# Patient Record
Sex: FEMALE | Race: WHITE | NOT HISPANIC OR LATINO | URBAN - METROPOLITAN AREA
[De-identification: names, ages, dates, MRNs, and addresses within clinical notes are randomized per-mention and may not be internally consistent; named-entity substitution may affect disease eponyms.]

---

## 2017-03-28 ENCOUNTER — TRANSFERRED RECORDS (OUTPATIENT)
Dept: HEALTH INFORMATION MANAGEMENT | Facility: CLINIC | Age: 67
End: 2017-03-28

## 2017-04-19 ENCOUNTER — TRANSFERRED RECORDS (OUTPATIENT)
Dept: HEALTH INFORMATION MANAGEMENT | Facility: CLINIC | Age: 67
End: 2017-04-19

## 2017-07-25 ENCOUNTER — TRANSFERRED RECORDS (OUTPATIENT)
Dept: HEALTH INFORMATION MANAGEMENT | Facility: CLINIC | Age: 67
End: 2017-07-25

## 2017-07-28 ENCOUNTER — TRANSFERRED RECORDS (OUTPATIENT)
Dept: HEALTH INFORMATION MANAGEMENT | Facility: CLINIC | Age: 67
End: 2017-07-28

## 2017-07-31 ENCOUNTER — TRANSFERRED RECORDS (OUTPATIENT)
Dept: HEALTH INFORMATION MANAGEMENT | Facility: CLINIC | Age: 67
End: 2017-07-31

## 2017-08-09 ENCOUNTER — MEDICAL CORRESPONDENCE (OUTPATIENT)
Dept: HEALTH INFORMATION MANAGEMENT | Facility: CLINIC | Age: 67
End: 2017-08-09

## 2017-10-30 ENCOUNTER — TRANSFERRED RECORDS (OUTPATIENT)
Dept: HEALTH INFORMATION MANAGEMENT | Facility: CLINIC | Age: 67
End: 2017-10-30

## 2017-11-09 ENCOUNTER — TRANSFERRED RECORDS (OUTPATIENT)
Dept: HEALTH INFORMATION MANAGEMENT | Facility: CLINIC | Age: 67
End: 2017-11-09

## 2017-12-12 ENCOUNTER — OFFICE VISIT (OUTPATIENT)
Dept: PHYSICAL MEDICINE AND REHAB | Facility: CLINIC | Age: 67
End: 2017-12-12

## 2017-12-12 VITALS — HEIGHT: 62 IN | DIASTOLIC BLOOD PRESSURE: 92 MMHG | SYSTOLIC BLOOD PRESSURE: 158 MMHG | HEART RATE: 75 BPM

## 2017-12-12 DIAGNOSIS — M67.969 LOWER EXTREMITY TENDINOPATHY: Primary | ICD-10-CM

## 2017-12-12 DIAGNOSIS — G89.4 CHRONIC PAIN SYNDROME: ICD-10-CM

## 2017-12-12 RX ORDER — NORTRIPTYLINE HCL 10 MG
10 CAPSULE ORAL AT BEDTIME
COMMUNITY
Start: 2017-11-16 | End: 2018-04-17

## 2017-12-12 RX ORDER — ALENDRONATE SODIUM 70 MG/1
70 TABLET ORAL
COMMUNITY
Start: 2013-08-01

## 2017-12-12 RX ORDER — GABAPENTIN 300 MG/1
300 CAPSULE ORAL 3 TIMES DAILY
COMMUNITY
Start: 2017-07-24

## 2017-12-12 RX ORDER — EPINEPHRINE 0.3 MG/.3ML
0.3 INJECTION SUBCUTANEOUS
COMMUNITY
Start: 2017-03-30

## 2017-12-12 RX ORDER — NAPROXEN 500 MG/1
1 TABLET ORAL 2 TIMES DAILY
COMMUNITY
Start: 2016-07-01

## 2017-12-12 ASSESSMENT — PAIN SCALES - GENERAL: PAINLEVEL: SEVERE PAIN (7)
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
## 2017-12-12 NOTE — NURSING NOTE
Chief Complaint   Patient presents with     Consult     Nerve issues    Aimee Fernandes, TIFFANY
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
## 2017-12-12 NOTE — MR AVS SNAPSHOT
"              After Visit Summary   12/12/2017    Shabana Ware    MRN: [id number]           Patient Information     Date Of Birth          1950        Visit Information        Provider Department      12/12/2017 1:00 PM More Samson MD [facility] Physical Medicine and Rehabilitation        Today's Diagnoses     Lower extremity tendinopathy    -  1    Chronic pain syndrome           Follow-ups after your visit        Additional Services     PHYSICAL THERAPY REFERRAL       Right hip adductor tendinopathy     Treatment: Evaluation & Treatment  Special Instructions/Modalities: as needed for pain management      Please be aware that coverage of these services is subject to the terms and limitations of your health insurance plan.  Call member services at your health plan with any benefit or coverage questions.      **Note to Provider:  If you are referring outside of [facility] for the therapy appointment, please list the name of the location in the \"special instructions\" above, print the referral and give to the patient to schedule the appointment.                  Follow-up notes from your care team     Return in about 3 months (around 3/12/2018).      Your next 10 appointments already scheduled     Mar 16, 2018 11:20 AM CDT   (Arrive by 11:05 AM)   Return Visit with More Samson MD   [facility] Physical Medicine and Rehabilitation ([facility] [place])    [address] [place] [id number] [phone]              Who to contact     Please call your clinic at [phone] to:    Ask questions about your health    Make or cancel appointments    Discuss your medicines    Learn about your test results    Speak to your doctor   If you have compliments or concerns about an experience at your clinic, or if you wish to file a complaint, please contact [facility] Phys[facility] Patient Relations at [phone] or email us at [email or web address]         " "Additional Information About Your Visit        [facility] Information     [facility] gives you secure access to your electronic health record. If you see a primary care provider, you can also send messages to your care team and make appointments. If you have questions, please call your primary care clinic.  If you do not have a primary care provider, please call [phone] and they will assist you.      [facility] is an electronic gateway that provides easy, online access to your medical records. With [facility], you can request a clinic appointment, read your test results, renew a prescription or communicate with your care team.     To access your existing account, please contact your [facility] or call [phone] for assistance.        Care EveryWhere ID     This is your Care EveryWhere ID. This could be used by other organizations to access your [facility] medical records  [id number]        Your Vitals Were     Pulse Height                75 1.575 m (5' 2\")           Blood Pressure from Last 3 Encounters:   12/12/17 (!) 158/92    Weight from Last 3 Encounters:   No data found for Wt              We Performed the Following     PHYSICAL THERAPY REFERRAL        Primary Care Provider Office Phone # Fax #    Chelsey I Ernesto [phone]       [facility] [address] [place] [address]        Equal Access to Services     TERESA MCGRAW : Hadii aad ku hadasho Soomaali, waaxda luqadaha, qaybta kaalmada adeegyada, waxay idiin hayspensern gurdeep kumar lamony anand. So [facility] [phone].    ATENCIÓN: Si habla español, tiene a byrnes disposición servicios gratuitos de asistencia lingüística. Llame al [phone].    We comply with applicable federal civil rights laws and [place] laws. We do not discriminate on the basis of race, color, national origin, age, disability, sex, sexual orientation, or gender identity.            Thank you!     Thank you for choosing  [facility]  for your care. Our goal is always to provide you with excellent care. Hearing back from our patients is one way we can continue to improve our services. Please take a few minutes to complete the written survey that you may receive in the mail after your visit with us. Thank you!             Your Updated Medication List - Protect others around you: Learn how to safely use, store and throw away your medicines at www.disposemymeds.org.          This list is accurate as of: 12/12/17 11:59 PM.  Always use your most recent med list.                   Brand Name Dispense Instructions for use Diagnosis    alendronate 70 MG tablet    FOSAMAX          EPINEPHrine 0.3 MG/0.3ML injection 2-pack    EPIPEN/ADRENACLICK/or ANY BX GENERIC EQUIV     Inject 0.3 mg into the muscle        gabapentin 300 MG capsule    NEURONTIN          naproxen 500 MG Tbec   Generic drug:  naproxen           nortriptyline 10 MG capsule    PAMELOR

## 2018-02-21 ENCOUNTER — OFFICE VISIT (OUTPATIENT)
Dept: PHYSICAL MEDICINE AND REHAB | Facility: CLINIC | Age: 68
End: 2018-02-21
Payer: MEDICARE

## 2018-02-21 VITALS
DIASTOLIC BLOOD PRESSURE: 87 MMHG | SYSTOLIC BLOOD PRESSURE: 180 MMHG | HEART RATE: 62 BPM | HEIGHT: 62 IN | WEIGHT: 134.1 LBS | BODY MASS INDEX: 24.68 KG/M2

## 2018-02-21 DIAGNOSIS — Z96.641 HISTORY OF TOTAL RIGHT HIP REPLACEMENT: ICD-10-CM

## 2018-02-21 DIAGNOSIS — G89.29 CHRONIC RIGHT HIP PAIN: Primary | ICD-10-CM

## 2018-02-21 DIAGNOSIS — M25.551 CHRONIC RIGHT HIP PAIN: Primary | ICD-10-CM

## 2018-02-21 NOTE — PATIENT INSTRUCTIONS
We addressed the following today:    1. Right hip pain  2. History of right total hip replacement    Activity modification as discussed  Home exercise program as instructed  Topical Treatments: Ice or heat  Over the counter medication: Acetaminophen (Tylenol) 1000 mg every 6 hours with food (Maximum of 3000 mg/day)  Ibuprofen (Advil) maximum of 800 mg four times a day with food  Call after obtaining outside medical records/imaging and reviewing to discuss further medical care (sooner if needed; call direct clinic number [[phone]] at any time with questions/concerns)

## 2018-02-21 NOTE — PROGRESS NOTES
"[facility] Physical Medicine & Rehabilitation Clinic Note  Clinic Visit s Feb 21, 2018    Subjective:  Shabana Ware is a 67 year old female who is seen as self referral for evaluation of chronic right hip pain.    Symptoms began 2.5 years ago.  Reports insidious onset without acute precipitating event.  Reports right hip pain that is located anterior with radiation present (right medial thigh pain to the right knee).  Pain is 8/10 in maximal severity and 7/10 currently.  Symptoms are generally worse with walking activities, physical activities (including swimming and ellipitical), and with sitting activities and better with ice.  Treatment has consisted of Gabapentin, Lyrica, a right hip adductor corticosteroid injection, a right hip flexor corticosteroid injection, acupuncture, myofascial release, physical therapy, a right total hip arthroplasty, L2-3 and L3-4 epidural corticosteroid injection, a right obturator/femoral nerve block/radiofrequency ablation, Ketamine gel, Graston therapy, Voltaren gel, and Naproxen without long-term relief.  Denies any weakness of the right lower extremity.  Notes numbness/tingling of the right medial thigh.  Denies any change in symptoms with coughing/sneezing.    Patient's past medical, surgical, social, and family histories were reviewed today.  Significant medical history as noted above.  History reviewed. No pertinent past medical history.    Review of Systems:  Constitutional: NEGATIVE for fever, chills, or change in weight  Skin: NEGATIVE for worrisome rashes, moles, or lesions  Neuro: NEGATIVE for weakness of the right lower extremity  MSK: see HPI    Objective:  /87  Pulse 62  Ht 1.575 m (5' 2\")  Wt 60.8 kg (134 lb 1.6 oz)  BMI 24.53 kg/m2  General: healthy, alert, and in mild distress  Skin: no suspicious lesions or rashes  Psych: mentation appears normal and affect normal/bright  HEENT: no scleral icterus  CV: no pedal edema  Resp: normal respiratory " effort without conversational dyspnea   Neuro: sensory exam is within normal limits.  Motor strength as noted below  Lymph: no palpable lymphadenopathy    MSK:    RIGHT HIP  Inspection:    Anterolateral surgical scar noted of the hip region  Palpation:    Tender about the anterior groin/joint line and adductor musculature    No tenderness over the greater trochanteric region, gluteal muscles, or ASIS  Passive Range of Motion:    IR within normal limits / ER within normal limits with pain  Strength:    Flexion 5-/5 / adduction 5-/5 with pain / abduction 5/5  Special Tests:    Positive: Logroll    Negative: resisted gluteus medius provocation, anterior impingement (FADIR), Leeann's, Shorty, and Symone's    Imaging:  Outside reports from [facility] were reviewed today and results were discussed with the patient  EMG/NCV of the Right Lower Extremity -10/30/2017   Clinical Interpretation:  1. This is an abnormal study.  2. There is electrodiagnostic evidence of a chronic inactive right L4 radiculopathy.  3. There is no evidence of an obturator mononeuropathy    CT of the Pelvis without Contrast   Impression:  1.  Right total hip arthroplasty.  There is some heterotopic mineralization associated with the lesser trochanter extending anterior to the hip.  There is a fluid collection deep to the rectus femoris and tensor fascia elaine anterior to the right hip joint measuring approximately 22 x 15 x 23 mm.    2.  No evidence for fracture.  3.  Advanced osteoarthritis of the left hip with postoperative changes of the lumbar spine.     ASSESSMENT:  1.  Chronic right hip pain - differential diagnoses includes adductor tendinopathy, peripheral nerve entrapment, failed total hip arthroplasty, etc.  2.  History of a right total hip arthroplasty    PLAN:  1.  Medical records release form signed to obtain outside medical records from [facility] and outside medical records/imaging from [facility].  2.  Activity modification  as discussed, including limitation of activities that cause pain/discomfort.  3.  Acetaminophen/Ibuprofen/ice/heat as needed for improved pain control.  4.  Call/MyChart 1-2 business days after obtaining all outside medical records after review for discussion of results/further medical care.  Consider an MRI of the right hip without contrast, a repeat EMG/NCV of the right lower extremity, bone scan, etc. as deemed appropriate moving forward.  If symptoms resolve completely, can follow-up as needed.      I spent a total of 35 minutes face-to-face with the patient during today's office visit.  Over 50% of the time was spent counseling the patient and/or coordinating care regarding previous diagnostic testing, previous treatment, further treatment, etc.  See office note for details.    Angel Blood DO, ELVIEM    Department of Rehabilitation Medicine

## 2018-02-21 NOTE — MR AVS SNAPSHOT
After Visit Summary   2/21/2018    Shabana Ware    MRN: [id number]           Patient Information     Date Of Birth          1950        Visit Information        Provider Department      2/21/2018 10:00 AM Angel Blood DO M [facility] Physical Medicine and Rehabilitation        Today's Diagnoses     Chronic right hip pain    -  1    History of total right hip replacement          Care Instructions    We addressed the following today:    1. Right hip pain  2. History of right total hip replacement    Activity modification as discussed  Home exercise program as instructed  Topical Treatments: Ice or heat  Over the counter medication: Acetaminophen (Tylenol) 1000 mg every 6 hours with food (Maximum of 3000 mg/day)  Ibuprofen (Advil) maximum of 800 mg four times a day with food  Call after obtaining outside medical records/imaging and reviewing to discuss further medical care (sooner if needed; call direct clinic number [[phone]] at any time with questions/concerns)            Follow-ups after your visit        Follow-up notes from your care team     Call patient with results      Your next 10 appointments already scheduled     Mar 21, 2018 12:30 PM CDT   (Arrive by 12:15 PM)   Return Visit with DO SHANTEL Vizcarra [facility] Physical Medicine and Rehabilitation ([facility])    [address] [place] [id number] [phone]              Who to contact     Please call your clinic at [phone] to:    Ask questions about your health    Make or cancel appointments    Discuss your medicines    Learn about your test results    Speak to your doctor            Additional Information About Your Visit        MyChart Information     [employer] gives you secure access to your electronic health record. If you see a primary care provider, you can also send messages to your care team and make appointments. If you have questions, please call your primary care  "clinic.  If you do not have a primary care provider, please call [phone] and they will assist you.      [employer] is an electronic gateway that provides easy, online access to your medical records. With [employer], you can request a clinic appointment, read your test results, renew a prescription or communicate with your care team.     To access your existing account, please contact your [facility] or call [phone] for assistance.        Care EveryWhere ID     This is your Care EveryWhere ID. This could be used by other organizations to access your [facility] medical records  [id number]        Your Vitals Were     Pulse Height BMI (Body Mass Index)             62 1.575 m (5' 2\") 24.53 kg/m2          Blood Pressure from Last 3 Encounters:   02/21/18 180/87   12/12/17 (!) 158/92    Weight from Last 3 Encounters:   02/21/18 60.8 kg (134 lb 1.6 oz)              Today, you had the following     No orders found for display       Primary Care Provider Office Phone # Fax #    Chelsey I [place] [phone]       [facility] [address] [place] [address]        Equal Access to Services     TERESA MCGRAW : Hadii aad ku hadasho Soomaali, waaxda luqadaha, qaybta kaalmada adeegyada, bebe anand. So [facility] [phone].    ATENCIÓN: Si habla español, tiene a byrnes disposición servicios gratuitos de asistencia lingüística. Llame al [phone].    We comply with applicable federal civil rights laws and [place] laws. We do not discriminate on the basis of race, color, national origin, age, disability, sex, sexual orientation, or gender identity.            Thank you!     Thank you for choosing [facility] PHYSICAL MEDICINE AND REHABILITATION  for your care. Our goal is always to provide you with excellent care. Hearing back from our patients is one way we can continue to improve our services. Please take a few minutes to complete the written " survey that you may receive in the mail after your visit with us. Thank you!             Your Updated Medication List - Protect others around you: Learn how to safely use, store and throw away your medicines at www.disposemymeds.org.          This list is accurate as of 2/21/18 11:19 AM.  Always use your most recent med list.                   Brand Name Dispense Instructions for use Diagnosis    alendronate 70 MG tablet    FOSAMAX          EPINEPHrine 0.3 MG/0.3ML injection 2-pack    EPIPEN/ADRENACLICK/or ANY BX GENERIC EQUIV     Inject 0.3 mg into the muscle        gabapentin 300 MG capsule    NEURONTIN          naproxen 500 MG Tbec   Generic drug:  naproxen           nortriptyline 10 MG capsule    PAMELOR

## 2018-02-21 NOTE — NURSING NOTE
Chief Complaint   Patient presents with     Consult     referral from Dr. Samson for right hip pain, s/p right obturator/hip RFA     Gemma jonas

## 2018-02-21 NOTE — LETTER
2/21/2018       RE: Shabana Ware  [address]  [place] [address]     Dear Colleague,    Thank you for referring your patient, Shabana Ware, to the [facility] PHYSICAL MEDICINE AND REHABILITATION at [facility]. Please see a copy of my visit note below.    [facility] Physical Medicine & Rehabilitation Clinic Note  Clinic Visit s Feb 21, 2018    Subjective:  Shabana Ware is a 67 year old female who is seen as self referral for evaluation of chronic right hip pain.    Symptoms began 2.5 years ago.  Reports insidious onset without acute precipitating event.  Reports right hip pain that is located anterior with radiation present (right medial thigh pain to the right knee).  Pain is 8/10 in maximal severity and 7/10 currently.  Symptoms are generally worse with walking activities, physical activities (including swimming and ellipitical), and with sitting activities and better with ice.  Treatment has consisted of Gabapentin, Lyrica, a right hip adductor corticosteroid injection, a right hip flexor corticosteroid injection, acupuncture, myofascial release, physical therapy, a right total hip arthroplasty, L2-3 and L3-4 epidural corticosteroid injection, a right obturator/femoral nerve block/radiofrequency ablation, Ketamine gel, Graston therapy, Voltaren gel, and Naproxen without long-term relief.  Denies any weakness of the right lower extremity.  Notes numbness/tingling of the right medial thigh.  Denies any change in symptoms with coughing/sneezing.    Patient's past medical, surgical, social, and family histories were reviewed today.  Significant medical history as noted above.  History reviewed. No pertinent past medical history.    Review of Systems:  Constitutional: NEGATIVE for fever, chills, or change in weight  Skin: NEGATIVE for worrisome rashes, moles, or lesions  Neuro: NEGATIVE for weakness of the right lower extremity  MSK: see  "HPI    Objective:  /87  Pulse 62  Ht 1.575 m (5' 2\")  Wt 60.8 kg (134 lb 1.6 oz)  BMI 24.53 kg/m2  General: healthy, alert, and in mild distress  Skin: no suspicious lesions or rashes  Psych: mentation appears normal and affect normal/bright  HEENT: no scleral icterus  CV: no pedal edema  Resp: normal respiratory effort without conversational dyspnea   Neuro: sensory exam is within normal limits.  Motor strength as noted below  Lymph: no palpable lymphadenopathy    MSK:    RIGHT HIP  Inspection:    Anterolateral surgical scar noted of the hip region  Palpation:    Tender about the anterior groin/joint line and adductor musculature    No tenderness over the greater trochanteric region, gluteal muscles, or ASIS  Passive Range of Motion:    IR within normal limits / ER within normal limits with pain  Strength:    Flexion 5-/5 / adduction 5-/5 with pain / abduction 5/5  Special Tests:    Positive: Logroll    Negative: resisted gluteus medius provocation, anterior impingement (FADIR), Leeann's, Shorty, and Symone's    Imaging:  Outside reports from [facility] were reviewed today and results were discussed with the patient  EMG/NCV of the Right Lower Extremity -10/30/2017   Clinical Interpretation:  1. This is an abnormal study.  2. There is electrodiagnostic evidence of a chronic inactive right L4 radiculopathy.  3. There is no evidence of an obturator mononeuropathy    CT of the Pelvis without Contrast   Impression:  1.  Right total hip arthroplasty.  There is some heterotopic mineralization associated with the lesser trochanter extending anterior to the hip.  There is a fluid collection deep to the rectus femoris and tensor fascia elaine anterior to the right hip joint measuring approximately 22 x 15 x 23 mm.    2.  No evidence for fracture.  3.  Advanced osteoarthritis of the left hip with postoperative changes of the lumbar spine.     ASSESSMENT:  1.  Chronic right hip pain - differential diagnoses " includes adductor tendinopathy, peripheral nerve entrapment, failed total hip arthroplasty, etc.  2.  History of a right total hip arthroplasty    PLAN:  1.  Medical records release form signed to obtain outside medical records from [facility] and outside medical records/imaging from [facility].  2.  Activity modification as discussed, including limitation of activities that cause pain/discomfort.  3.  Acetaminophen/Ibuprofen/ice/heat as needed for improved pain control.  4.  Call/MyChart 1-2 business days after obtaining all outside medical records after review for discussion of results/further medical care.  Consider an MRI of the right hip without contrast, a repeat EMG/NCV of the right lower extremity, bone scan, etc. as deemed appropriate moving forward.  If symptoms resolve completely, can follow-up as needed.      I spent a total of 35 minutes face-to-face with the patient during today's office visit.  Over 50% of the time was spent counseling the patient and/or coordinating care regarding previous diagnostic testing, previous treatment, further treatment, etc.  See office note for details.      Again, thank you for allowing me to participate in the care of your patient.      Sincerely,    Angel Blood, DO

## 2018-03-01 ENCOUNTER — TELEPHONE (OUTPATIENT)
Dept: PHYSICAL MEDICINE AND REHAB | Facility: CLINIC | Age: 68
End: 2018-03-01

## 2018-03-01 DIAGNOSIS — M25.551 CHRONIC RIGHT HIP PAIN: Primary | ICD-10-CM

## 2018-03-01 DIAGNOSIS — Z96.641 HISTORY OF TOTAL RIGHT HIP ARTHROPLASTY: ICD-10-CM

## 2018-03-01 DIAGNOSIS — G89.29 CHRONIC RIGHT HIP PAIN: Primary | ICD-10-CM

## 2018-03-09 NOTE — TELEPHONE ENCOUNTER
MRI of the left hip without contrast from 11/9/2017 was noted to show advanced left hip arthritis, status post right hip total arthroplasty, high-grade partial thickness hamstring origin tendon tears bilaterally, and no hip effusion, iliopsoas bursitis, or trochanteric bursitis of either hip.  Will attempt to have our radiology department read the images that were received on a CD.    Angel Blood DO, OMID    Department of Rehabilitation Medicine

## 2018-03-12 NOTE — TELEPHONE ENCOUNTER
Telephone call placed to the patient with message left informing her that her MRI of the left hip from [facility] was received and reviewed and that the CD was sent off to our radiology department for review.  Informed the patient that we will call her after our radiology department places a formal interpretation of these images to discuss the results to determine further medical care/treatment at that time.  Telephone number left with the patient to call with any further questions/concerns at this time.    Angel Blood DO, ELVIEM    Department of Rehabilitation Medicine

## 2018-03-15 DIAGNOSIS — M25.551 HIP PAIN, RIGHT: Primary | ICD-10-CM

## 2018-03-16 NOTE — TELEPHONE ENCOUNTER
Please also cancel the patient's appointment scheduled for next week as will call her after further review of imaging to determine further medical care and if another appointment is needed at that time.    Angel Blood DO, CAQSM    Department of Rehabilitation Medicine

## 2018-03-16 NOTE — TELEPHONE ENCOUNTER
Please call patient to inform that telephone message received on 3/14.  Inform that do not think that her symptoms are related to pelvic congestion syndrome, but that this would be a better question for her OB/GYN physician.  Will call after overread of MRI of the left hip completed by radiology to discuss results/further medical care.    Angel Blood DO, CAQSM    Department of Rehabilitation Medicine

## 2018-03-20 NOTE — TELEPHONE ENCOUNTER
Telephone message left instructing the patient to return our call to discuss her MR left hip results and further medical care.    Did speak with a [facility] radiologist over the phone regarding the patient's MR of the left hip with components of a right total hip arthroplasty, left hip arthritis, and high grade tearing of the proximal hamstrings noted.  Treatment options include an MR of the right hip, bone scan, pain management referral, repeat EMG/NCV of the right lower extremity, etc.    Angel Blood DO, ELVIEM    Department of Rehabilitation Medicine

## 2018-03-20 NOTE — TELEPHONE ENCOUNTER
Spoke to the patient at 4:30 PM on 3/20.  Discussed MRI of the left hip findings and discussed further diagnostic/treatment options as outlined previously.  Patient wished to proceed with an MRI of the right hip/femur without contrast for further evaluation of the patient's current medical state with follow-up to occur 2-3 business days after completion of further diagnostic imaging for discussion of results/further medical care.    Angel Blood DO, ELVIEM    Department of Rehabilitation Medicine

## 2018-04-05 ENCOUNTER — HOSPITAL ENCOUNTER (OUTPATIENT)
Dept: MRI IMAGING | Facility: CLINIC | Age: 68
Discharge: HOME | End: 2018-04-05
Attending: PHYSICAL MEDICINE & REHABILITATION | Admitting: PHYSICAL MEDICINE & REHABILITATION
Payer: MEDICARE

## 2018-04-05 DIAGNOSIS — G89.29 CHRONIC RIGHT HIP PAIN: ICD-10-CM

## 2018-04-05 DIAGNOSIS — M25.551 CHRONIC RIGHT HIP PAIN: ICD-10-CM

## 2018-04-05 DIAGNOSIS — Z96.641 HISTORY OF TOTAL RIGHT HIP ARTHROPLASTY: ICD-10-CM

## 2018-04-05 PROCEDURE — 73721 MRI JNT OF LWR EXTRE W/O DYE: CPT | Mod: RT

## 2018-04-17 ENCOUNTER — OFFICE VISIT (OUTPATIENT)
Dept: PHYSICAL MEDICINE AND REHAB | Facility: CLINIC | Age: 68
End: 2018-04-17
Payer: MEDICARE

## 2018-04-17 VITALS
HEIGHT: 62 IN | DIASTOLIC BLOOD PRESSURE: 98 MMHG | SYSTOLIC BLOOD PRESSURE: 156 MMHG | WEIGHT: 132.2 LBS | BODY MASS INDEX: 24.33 KG/M2 | HEART RATE: 70 BPM | OXYGEN SATURATION: 98 % | TEMPERATURE: 97.7 F | RESPIRATION RATE: 24 BRPM

## 2018-04-17 DIAGNOSIS — G89.29 CHRONIC RIGHT HIP PAIN: Primary | ICD-10-CM

## 2018-04-17 DIAGNOSIS — Z96.641 HISTORY OF TOTAL RIGHT HIP ARTHROPLASTY: ICD-10-CM

## 2018-04-17 DIAGNOSIS — M25.551 CHRONIC RIGHT HIP PAIN: Primary | ICD-10-CM

## 2018-04-17 PROBLEM — T14.8XXA MUSCLE STRAIN: Status: ACTIVE | Noted: 2018-04-17

## 2018-04-17 RX ORDER — BUPIVACAINE HYDROCHLORIDE 2.5 MG/ML
2.5 INJECTION, SOLUTION INFILTRATION; PERINEURAL ONCE
Qty: 2.5 ML | Refills: 0 | Status: SHIPPED | OUTPATIENT
Start: 2018-04-17 | End: 2018-04-17

## 2018-04-17 RX ORDER — LIDOCAINE HYDROCHLORIDE 10 MG/ML
1 INJECTION, SOLUTION INFILTRATION; PERINEURAL ONCE
Qty: 1 ML | Refills: 0 | OUTPATIENT
Start: 2018-04-17 | End: 2018-04-17

## 2018-04-17 RX ORDER — LIDOCAINE HYDROCHLORIDE 10 MG/ML
2.5 INJECTION, SOLUTION INFILTRATION; PERINEURAL ONCE
Qty: 2.5 ML | Refills: 0 | Status: SHIPPED | OUTPATIENT
Start: 2018-04-17 | End: 2018-04-17

## 2018-04-17 ASSESSMENT — PAIN SCALES - GENERAL: PAINLEVEL: SEVERE PAIN (7)

## 2018-04-17 NOTE — PATIENT INSTRUCTIONS
We addressed the following today:    1. Chronic right hip pain  2. History of right hip replacement    Activity modification as discussed  Physical therapy: External  Topical Treatments: Ice or Heat  Over the counter medication: Acetaminophen (Tylenol) 1000 mg every 6 hours with food (Maximum of 3000 mg/day)  Naproxen (Aleve) maximum of 440 mg two times a day with food  Bone Scan: Outside hospital  Trigger point injections of the right medial hip/thigh region completed in clinic today  Other specific instructions: Call/MyChart 1-2 business days after completion of diagnostic testing for discussion of results/further medical care (sooner if needed; call direct clinic number [[phone]] at any time with questions/concerns)

## 2018-04-17 NOTE — MR AVS SNAPSHOT
After Visit Summary   4/17/2018    Shabana Ware    MRN: [id number]           Patient Information     Date Of Birth          1950        Visit Information        Provider Department      4/17/2018 10:45 AM Angel Blood DO [facility] Physical Medicine and Rehabilitation        Today's Diagnoses     Chronic right hip pain    -  1    History of total right hip arthroplasty          Care Instructions    We addressed the following today:    1. Chronic right hip pain  2. History of right hip replacement    Activity modification as discussed  Physical therapy: External  Topical Treatments: Ice or Heat  Over the counter medication: Acetaminophen (Tylenol) 1000 mg every 6 hours with food (Maximum of 3000 mg/day)  Naproxen (Aleve) maximum of 440 mg two times a day with food  Bone Scan: Outside hospital  Trigger point injections of the right medial hip region completed in clinic today  Other specific instructions: Call/MyChart 1-2 business days after completion of diagnostic testing for discussion of results/further medical care (sooner if needed; call direct clinic number [[phone]] at any time with questions/concerns)            Follow-ups after your visit        Additional Services     PHYSICAL THERAPY REFERRAL       Diagnoses: Chronic right hip pain/history of total hip arthroplasty    Treatment: Formal physical therapy - exercises to include quadriceps/hamstring strengthening along with hip abductor/adductor/flexor/extensor/internal rotator/external rotator strengthening/stretching with targeting of functional tasks of daily living including gait and balance training with use of modalities including Astym/Grast therapy with home exercise prescription.                  Who to contact     Please call your clinic at [phone] to:    Ask questions about your health    Make or cancel appointments    Discuss your medicines    Learn about your test results    Speak to your doctor            Additional  "Information About Your Visit        [email or web address] Information     [employer] gives you secure access to your electronic health record. If you see a primary care provider, you can also send messages to your care team and make appointments. If you have questions, please call your primary care clinic.  If you do not have a primary care provider, please call [phone] and they will assist you.      [employer] is an electronic gateway that provides easy, online access to your medical records. With [employer], you can request a clinic appointment, read your test results, renew a prescription or communicate with your care team.     To access your existing account, please contact your [facility] or call [phone] for assistance.        Care EveryWhere ID     This is your Care EveryWhere ID. This could be used by other organizations to access your [place] medical records  [id number]        Your Vitals Were     Pulse Temperature Respirations Height Pulse Oximetry Breastfeeding?    70 97.7  F (36.5  C) (Oral) 24 1.562 m (5' 1.5\") 98% No    BMI (Body Mass Index)                   24.57 kg/m2            Blood Pressure from Last 3 Encounters:   04/17/18 (!) 156/98   02/21/18 180/87   12/12/17 (!) 158/92    Weight from Last 3 Encounters:   04/17/18 60 kg (132 lb 3.2 oz)   02/21/18 60.8 kg (134 lb 1.6 oz)              We Performed the Following     Injection Single/Multiple Trigger Points, 1 or 2 Muscles (06335)     PHYSICAL THERAPY REFERRAL          Today's Medication Changes          These changes are accurate as of 4/17/18 11:47 AM.  If you have any questions, ask your nurse or doctor.               Start taking these medicines.        Dose/Directions    lidocaine 1 % injection   Used for:  Chronic right hip pain, History of total right hip arthroplasty   Started by:  Angel Blood,         Dose:  1 mL   1 mL by Other route once for 1 dose   Quantity:  1 mL   Refills:  0            Where to get your " medicines      Some of these will need a paper prescription and others can be bought over the counter.  Ask your nurse if you have questions.     You don't need a prescription for these medications     lidocaine 1 % injection                Primary Care Provider Office Phone # Fax #    Chelsey Orozco [phone]       [facility] [address] [place] [address]        Equal Access to Services     CAMACHO MCGRAW : Hadii aad ku hadasho Soomaali, waaxda luqadaha, qaybta kaalmada adeegyada, waxay idiin hayaan adeeg kharash lasymonen ah. So [facility] [phone].    ATENCIÓN: Si young lazaro, tiene a byrnes disposición servicios gratuitos de asistencia lingüística. [facility] [phone].    We comply with applicable federal civil rights laws and [place] laws. We do not discriminate on the basis of race, color, national origin, age, disability, sex, sexual orientation, or gender identity.            Thank you!     Thank you for choosing [facility] PHYSICAL MEDICINE AND REHABILITATION  for your care. Our goal is always to provide you with excellent care. Hearing back from our patients is one way we can continue to improve our services. Please take a few minutes to complete the written survey that you may receive in the mail after your visit with us. Thank you!             Your Updated Medication List - Protect others around you: Learn how to safely use, store and throw away your medicines at www.disposemymeds.org.          This list is accurate as of 4/17/18 11:47 AM.  Always use your most recent med list.                   Brand Name Dispense Instructions for use Diagnosis    alendronate 70 MG tablet    FOSAMAX     Take 70 mg by mouth every 7 days        EPINEPHrine 0.3 MG/0.3ML injection 2-pack    EPIPEN/ADRENACLICK/or ANY BX GENERIC EQUIV     Inject 0.3 mg into the muscle        gabapentin 300 MG capsule    NEURONTIN     Take 300 mg by mouth 3 times daily        lidocaine 1 % injection     1 mL     1 mL by Other route once for 1 dose    Chronic right hip pain, History of total right hip arthroplasty       naproxen 500 MG Tbec   Generic drug:  naproxen      Take 1 tablet by mouth 2 times daily

## 2018-04-17 NOTE — LETTER
"4/17/2018       RE: Shabana Ware  [address]  [place] [address]     Dear Colleague,    Thank you for referring your patient, Shabana Ware, to the [facility] PHYSICAL MEDICINE AND REHABILITATION at [facility]. Please see a copy of my visit note below.    [facility] Physical Medicine & Rehabilitation Follow-Up Clinic Note  Follow-up Visit s Apr 17, 2018    Subjective:  Shabana Ware is a 67 year old female who is seen in follow up for evaluation of chronic right pain for discussion of MR of the right hip with contrast results.  Last visit was in February 2018.  Since that time, symptoms have been unchanged.  Been using Naproxen for improvement of pain/discomfort.      Notes right anterior hip pain and lateral hip pain with radiation to the right medial thigh region.  Symptoms are worse with walking activities.  Denies any fevers or chills.  Denies any redness or warmth of the right hip.  Denies any numbness/tingling/weakness of the right lower extremity.    Patient's past medical, surgical, social, and family histories are reviewed today    Objective:  BP (!) 156/98  Pulse 70  Temp 97.7  F (36.5  C) (Oral)  Resp 24  Ht 1.562 m (5' 1.5\")  Wt 60 kg (132 lb 3.2 oz)  SpO2 98%  Breastfeeding? No  BMI 24.57 kg/m2  General: healthy, alert, and in no distress  Skin: no suspicious lesions or rashes  Neuro: motor exam as noted below    MSK:    RIGHT HIP  Inspection:    No swelling, bruising, discoloration, or obvious deformity or asymmetry  Palpation:    Tender about the adductor musculature, gluteus minimus, gluteus medius, and greater trochanter  Strength:   Knee extension 5/5 with pain / knee flexion 5/5  Special Tests:    Positive: Resisted gluteus medius provocation    Imaging:  No x-rays indicated during today's visit  Previous films were reviewed today, independent visualization of images was performed, discussed with Dr. Ferreira from orthopedic " surgery, and results were discussed with the patient  MR of the Right Hip without Contrast - 4/5/2018  Post surgical changes of right total hip arthroplasty with metallic  susceptibility artifact compromising assessment.  1. Severe tendinosis with moderate to high-grade partial tearing of the hamstrings at the ischial attachment.   2. At least moderate tearing of the direct head of the rectus femoris at it's iliac attachment.  3. Suspect moderate joint effusion with possible pseudocapsular defect, posterolaterally.    ASSESSMENT:  1.  Chronic right hip pain  2.  History of right total hip arthroplasty    PLAN:  1.  Discussed previous treatments along with additional treatment options including trigger point injections, ilioinguinal/iliohypogastric nerve blocks with ultrasound guidance, referral back to orthopedic surgery for second opinion regarding previous right total hip arthroplasty, further physical therapy, etc. with the patient.  Discussed unpredictable treatment options at this time and further diagnostic workup that could be completed including repeat right hip radiographs, bone scan, etc.  2.  Patient wished to proceed with right medial thigh trigger point injections.  Discussed potential side effects ncluding but not necessarily limited to infection, bleeding, allergic reaction, post-injection flare, injury to soft tissue and/or nerves and seizure.  Patient indicated their understanding and agreed to proceed.  See procedure note below for further details  3.  Formal physical therapy - exercises to include quadriceps/hamstring strengthening along with hip abductor/adductor/flexor/extensor/internal rotator/external rotator strengthening/stretching with targeting of functional tasks of daily living including gait and balance training with use of modalities (Astym/Graston therapy) with home exercise prescription.  4.  Bone scan ordered for further evaluation of current medical state to evaluate for loosening  of right total hip arthroplasty, signs of infection, etc.  5.  Call/MyChart 1-2 business days after completion of diagnostic testing for discussion of results/further medical care.    Right Medial Thigh Trigger Point(s) Injection(s)    Benefits, risks, indications for, and alternatives to the procedure were discussed with the patient, including bleeding, infection, etc.  Patient elected to proceed and informed consent was signed.    Areas were prepped with ChloraPrep and Ethyl Chloride was used for topical anesthetic purposes.  Using standard precautions a 25-gauge 1.5-inch needle was used to inject a 5 ml mixture of 2.5 ml of 1% Lidocaine and 2.5 ml of 0.5% Marcaine into the right abductor brevis, adductor jaylyn, and adductor longus muscle(s).  Patient tolerated the procedure well and there were no complications.      Physician spent a total of 35 minutes face-to-face with the patient, including 20 minutes spent discussing chief complaint and 15 minutes for completion of trigger point injections.      Angel Blood DO, CAZEM    Department of Rehabilitation Medicine

## 2018-04-17 NOTE — NURSING NOTE
Chief Complaint   Patient presents with     RECHECK     [facility]- MRI RESULTS REVIEW      Zack Chen, TIFFANY

## 2018-04-17 NOTE — PROGRESS NOTES
"[facility] Physical Medicine & Rehabilitation Follow-Up Clinic Note  Follow-up Visit s Apr 17, 2018    Subjective:  Shabana Ware is a 67 year old female who is seen in follow up for evaluation of chronic right pain for discussion of MR of the right hip with contrast results.  Last visit was in February 2018.  Since that time, symptoms have been unchanged.  Been using Naproxen for improvement of pain/discomfort.      Notes right anterior hip pain and lateral hip pain with radiation to the right medial thigh region.  Symptoms are worse with walking activities.  Denies any fevers or chills.  Denies any redness or warmth of the right hip.  Denies any numbness/tingling/weakness of the right lower extremity.    Patient's past medical, surgical, social, and family histories are reviewed today    Objective:  BP (!) 156/98  Pulse 70  Temp 97.7  F (36.5  C) (Oral)  Resp 24  Ht 1.562 m (5' 1.5\")  Wt 60 kg (132 lb 3.2 oz)  SpO2 98%  Breastfeeding? No  BMI 24.57 kg/m2  General: healthy, alert, and in no distress  Skin: no suspicious lesions or rashes  Neuro: motor exam as noted below    MSK:    RIGHT HIP  Inspection:    No swelling, bruising, discoloration, or obvious deformity or asymmetry  Palpation:    Tender about the adductor musculature, gluteus minimus, gluteus medius, and greater trochanter  Strength:   Knee extension 5/5 with pain / knee flexion 5/5  Special Tests:    Positive: Resisted gluteus medius provocation    Imaging:  No x-rays indicated during today's visit  Previous films were reviewed today, independent visualization of images was performed, discussed with Dr. Ferreira from orthopedic surgery, and results were discussed with the patient  MR of the Right Hip without Contrast - 4/5/2018  Post surgical changes of right total hip arthroplasty with metallic  susceptibility artifact compromising assessment.  1. Severe tendinosis with moderate to high-grade partial tearing of the hamstrings at the " ischial attachment.   2. At least moderate tearing of the direct head of the rectus femoris at it's iliac attachment.  3. Suspect moderate joint effusion with possible pseudocapsular defect, posterolaterally.    ASSESSMENT:  1.  Chronic right hip pain  2.  History of right total hip arthroplasty    PLAN:  1.  Discussed previous treatments along with additional treatment options including trigger point injections, ilioinguinal/iliohypogastric nerve blocks with ultrasound guidance, referral back to orthopedic surgery for second opinion regarding previous right total hip arthroplasty, further physical therapy, etc. with the patient.  Discussed unpredictable treatment options at this time and further diagnostic workup that could be completed including repeat right hip radiographs, bone scan, etc.  2.  Patient wished to proceed with right medial thigh trigger point injections.  Discussed potential side effects ncluding but not necessarily limited to infection, bleeding, allergic reaction, post-injection flare, injury to soft tissue and/or nerves and seizure.  Patient indicated their understanding and agreed to proceed.  See procedure note below for further details  3.  Formal physical therapy - exercises to include quadriceps/hamstring strengthening along with hip abductor/adductor/flexor/extensor/internal rotator/external rotator strengthening/stretching with targeting of functional tasks of daily living including gait and balance training with use of modalities (Astym/Graston therapy) with home exercise prescription.  4.  Bone scan ordered for further evaluation of current medical state to evaluate for loosening of right total hip arthroplasty, signs of infection, etc.  5.  Call/MyChart 1-2 business days after completion of diagnostic testing for discussion of results/further medical care.    Right Medial Thigh Trigger Point(s) Injection(s)    Benefits, risks, indications for, and alternatives to the procedure were  discussed with the patient, including bleeding, infection, etc.  Patient elected to proceed and informed consent was signed.    Areas were prepped with ChloraPrep and Ethyl Chloride was used for topical anesthetic purposes.  Using standard precautions a 25-gauge 1.5-inch needle was used to inject a 5 ml mixture of 2.5 ml of 1% Lidocaine and 2.5 ml of 0.5% Marcaine into the right abductor brevis, adductor jaylyn, and adductor longus muscle(s).  Patient tolerated the procedure well and there were no complications.      Physician spent a total of 35 minutes face-to-face with the patient, including 20 minutes spent discussing chief complaint and 15 minutes for completion of trigger point injections.      Angel Blood DO, CAZEM    Department of Rehabilitation Medicine

## 2018-04-24 ENCOUNTER — TRANSFERRED RECORDS (OUTPATIENT)
Dept: HEALTH INFORMATION MANAGEMENT | Facility: CLINIC | Age: 68
End: 2018-04-24

## 2018-05-08 ENCOUNTER — CARE COORDINATION (OUTPATIENT)
Dept: PHYSICAL MEDICINE AND REHAB | Facility: CLINIC | Age: 68
End: 2018-05-08

## 2018-05-08 NOTE — PROGRESS NOTES
A referral from Dr Blood was faxed to Dr David Browne at [facility] [phone] per request of patient. A copy of the referral was also mailed to the patient.

## 2018-05-22 DIAGNOSIS — G89.29 CHRONIC RIGHT HIP PAIN: Primary | ICD-10-CM

## 2018-05-22 DIAGNOSIS — M25.551 CHRONIC RIGHT HIP PAIN: Primary | ICD-10-CM

## 2018-05-25 ENCOUNTER — CARE COORDINATION (OUTPATIENT)
Dept: PHYSICAL MEDICINE AND REHAB | Facility: CLINIC | Age: 68
End: 2018-05-25

## 2018-05-25 DIAGNOSIS — G89.29 CHRONIC RIGHT HIP PAIN: Primary | ICD-10-CM

## 2018-05-25 DIAGNOSIS — M25.551 CHRONIC RIGHT HIP PAIN: Primary | ICD-10-CM

## 2018-05-25 NOTE — PROGRESS NOTES
Per patient's request, a referral from Dr Angel Blood to [facility] for a Right Ischiofemoral Bursa Corticosteroid Injection with Dr Jerad Guerra was faxed to [facility] [phone]. Patient stated that Dr David Browne, Orthopedic suggested to have this procedure. Patient informed that the referral was faxed and she will call to schedule.

## 2018-06-13 ENCOUNTER — TELEPHONE (OUTPATIENT)
Dept: PHYSICAL MEDICINE AND REHAB | Facility: CLINIC | Age: 68
End: 2018-06-13

## 2018-06-13 NOTE — TELEPHONE ENCOUNTER
[facility]    Phone Message    May a detailed message be left on voicemail: no    Reason for Call: Other: Radha at [facility] requesting a call back from a nurse for clarification on the order for hip injection. Please call back at [phone].     Action Taken: Message routed to:  [facility] ([facility] KALIN

## 2018-06-18 NOTE — TELEPHONE ENCOUNTER
Patient is scheduled with Dr Jerad Guerra at [facility] for a right ischiofemoral bursa corticosteroid injection on 6/28/18 per order from Dr Angel Blood.

## 2020-03-11 ENCOUNTER — HEALTH MAINTENANCE LETTER (OUTPATIENT)
Age: 70
End: 2020-03-11

## 2020-12-27 ENCOUNTER — HEALTH MAINTENANCE LETTER (OUTPATIENT)
Age: 70
End: 2020-12-27

## 2021-04-25 ENCOUNTER — HEALTH MAINTENANCE LETTER (OUTPATIENT)
Age: 71
End: 2021-04-25

## 2021-10-09 ENCOUNTER — HEALTH MAINTENANCE LETTER (OUTPATIENT)
Age: 71
End: 2021-10-09

## 2022-03-26 ENCOUNTER — HEALTH MAINTENANCE LETTER (OUTPATIENT)
Age: 72
End: 2022-03-26

## 2022-05-21 ENCOUNTER — HEALTH MAINTENANCE LETTER (OUTPATIENT)
Age: 72
End: 2022-05-21

## 2022-09-17 ENCOUNTER — HEALTH MAINTENANCE LETTER (OUTPATIENT)
Age: 72
End: 2022-09-17

## 2023-06-04 ENCOUNTER — HEALTH MAINTENANCE LETTER (OUTPATIENT)
Age: 73
End: 2023-06-04

## (undated) RX ORDER — BUPIVACAINE HYDROCHLORIDE 2.5 MG/ML
INJECTION, SOLUTION EPIDURAL; INFILTRATION; INTRACAUDAL
Status: DISPENSED
Start: 2018-04-17

## (undated) RX ORDER — LIDOCAINE HYDROCHLORIDE 10 MG/ML
INJECTION, SOLUTION EPIDURAL; INFILTRATION; INTRACAUDAL; PERINEURAL
Status: DISPENSED
Start: 2018-04-17